# Patient Record
Sex: FEMALE | Race: WHITE | NOT HISPANIC OR LATINO | URBAN - METROPOLITAN AREA
[De-identification: names, ages, dates, MRNs, and addresses within clinical notes are randomized per-mention and may not be internally consistent; named-entity substitution may affect disease eponyms.]

---

## 2017-06-01 ENCOUNTER — OUTPATIENT (OUTPATIENT)
Dept: OUTPATIENT SERVICES | Facility: HOSPITAL | Age: 82
LOS: 1 days | End: 2017-06-01
Payer: MEDICAID

## 2017-06-01 DIAGNOSIS — Z98.89 OTHER SPECIFIED POSTPROCEDURAL STATES: Chronic | ICD-10-CM

## 2017-06-21 ENCOUNTER — INPATIENT (INPATIENT)
Facility: HOSPITAL | Age: 82
LOS: 4 days | Discharge: OTHER INSTITUTION | DRG: 552 | End: 2017-06-26
Attending: SURGERY | Admitting: SURGERY
Payer: MEDICARE

## 2017-06-21 VITALS
DIASTOLIC BLOOD PRESSURE: 73 MMHG | RESPIRATION RATE: 18 BRPM | OXYGEN SATURATION: 91 % | HEART RATE: 62 BPM | SYSTOLIC BLOOD PRESSURE: 238 MMHG | TEMPERATURE: 98 F

## 2017-06-21 DIAGNOSIS — Z29.9 ENCOUNTER FOR PROPHYLACTIC MEASURES, UNSPECIFIED: ICD-10-CM

## 2017-06-21 DIAGNOSIS — I73.9 PERIPHERAL VASCULAR DISEASE, UNSPECIFIED: ICD-10-CM

## 2017-06-21 DIAGNOSIS — I25.10 ATHEROSCLEROTIC HEART DISEASE OF NATIVE CORONARY ARTERY WITHOUT ANGINA PECTORIS: ICD-10-CM

## 2017-06-21 DIAGNOSIS — Z98.89 OTHER SPECIFIED POSTPROCEDURAL STATES: Chronic | ICD-10-CM

## 2017-06-21 DIAGNOSIS — M19.90 UNSPECIFIED OSTEOARTHRITIS, UNSPECIFIED SITE: ICD-10-CM

## 2017-06-21 DIAGNOSIS — E03.9 HYPOTHYROIDISM, UNSPECIFIED: ICD-10-CM

## 2017-06-21 DIAGNOSIS — S22.000A WEDGE COMPRESSION FRACTURE OF UNSPECIFIED THORACIC VERTEBRA, INITIAL ENCOUNTER FOR CLOSED FRACTURE: ICD-10-CM

## 2017-06-21 DIAGNOSIS — S22.029A UNSPECIFIED FRACTURE OF SECOND THORACIC VERTEBRA, INITIAL ENCOUNTER FOR CLOSED FRACTURE: ICD-10-CM

## 2017-06-21 DIAGNOSIS — G30.1 ALZHEIMER'S DISEASE WITH LATE ONSET: ICD-10-CM

## 2017-06-21 DIAGNOSIS — I10 ESSENTIAL (PRIMARY) HYPERTENSION: ICD-10-CM

## 2017-06-21 DIAGNOSIS — E78.5 HYPERLIPIDEMIA, UNSPECIFIED: ICD-10-CM

## 2017-06-21 LAB
ALBUMIN SERPL ELPH-MCNC: 4 G/DL — SIGNIFICANT CHANGE UP (ref 3.3–5)
ALP SERPL-CCNC: 64 U/L — SIGNIFICANT CHANGE UP (ref 40–120)
ALT FLD-CCNC: 24 U/L RC — SIGNIFICANT CHANGE UP (ref 10–45)
ANION GAP SERPL CALC-SCNC: 16 MMOL/L — SIGNIFICANT CHANGE UP (ref 5–17)
APPEARANCE UR: CLEAR — SIGNIFICANT CHANGE UP
APTT BLD: 23.5 SEC — LOW (ref 27.5–37.4)
AST SERPL-CCNC: 27 U/L — SIGNIFICANT CHANGE UP (ref 10–40)
BASOPHILS # BLD AUTO: 0.1 K/UL — SIGNIFICANT CHANGE UP (ref 0–0.2)
BASOPHILS NFR BLD AUTO: 1.3 % — SIGNIFICANT CHANGE UP (ref 0–2)
BILIRUB SERPL-MCNC: 0.6 MG/DL — SIGNIFICANT CHANGE UP (ref 0.2–1.2)
BILIRUB UR-MCNC: NEGATIVE — SIGNIFICANT CHANGE UP
BUN SERPL-MCNC: 32 MG/DL — HIGH (ref 7–23)
CALCIUM SERPL-MCNC: 9.5 MG/DL — SIGNIFICANT CHANGE UP (ref 8.4–10.5)
CHLORIDE SERPL-SCNC: 102 MMOL/L — SIGNIFICANT CHANGE UP (ref 96–108)
CO2 SERPL-SCNC: 24 MMOL/L — SIGNIFICANT CHANGE UP (ref 22–31)
COLOR SPEC: YELLOW — SIGNIFICANT CHANGE UP
CREAT SERPL-MCNC: 1.21 MG/DL — SIGNIFICANT CHANGE UP (ref 0.5–1.3)
DIFF PNL FLD: NEGATIVE — SIGNIFICANT CHANGE UP
EOSINOPHIL # BLD AUTO: 0.1 K/UL — SIGNIFICANT CHANGE UP (ref 0–0.5)
EOSINOPHIL NFR BLD AUTO: 1.1 % — SIGNIFICANT CHANGE UP (ref 0–6)
EPI CELLS # UR: SIGNIFICANT CHANGE UP /HPF
GLUCOSE SERPL-MCNC: 130 MG/DL — HIGH (ref 70–99)
GLUCOSE UR QL: NEGATIVE — SIGNIFICANT CHANGE UP
HCT VFR BLD CALC: 36.3 % — SIGNIFICANT CHANGE UP (ref 34.5–45)
HGB BLD-MCNC: 12.3 G/DL — SIGNIFICANT CHANGE UP (ref 11.5–15.5)
INR BLD: 1.01 RATIO — SIGNIFICANT CHANGE UP (ref 0.88–1.16)
KETONES UR-MCNC: ABNORMAL
LEUKOCYTE ESTERASE UR-ACNC: NEGATIVE — SIGNIFICANT CHANGE UP
LYMPHOCYTES # BLD AUTO: 3 K/UL — SIGNIFICANT CHANGE UP (ref 1–3.3)
LYMPHOCYTES # BLD AUTO: 37.2 % — SIGNIFICANT CHANGE UP (ref 13–44)
MCHC RBC-ENTMCNC: 32.2 PG — SIGNIFICANT CHANGE UP (ref 27–34)
MCHC RBC-ENTMCNC: 33.8 GM/DL — SIGNIFICANT CHANGE UP (ref 32–36)
MCV RBC AUTO: 95.3 FL — SIGNIFICANT CHANGE UP (ref 80–100)
MONOCYTES # BLD AUTO: 0.7 K/UL — SIGNIFICANT CHANGE UP (ref 0–0.9)
MONOCYTES NFR BLD AUTO: 8.8 % — SIGNIFICANT CHANGE UP (ref 2–14)
NEUTROPHILS # BLD AUTO: 4.2 K/UL — SIGNIFICANT CHANGE UP (ref 1.8–7.4)
NEUTROPHILS NFR BLD AUTO: 51.6 % — SIGNIFICANT CHANGE UP (ref 43–77)
NITRITE UR-MCNC: NEGATIVE — SIGNIFICANT CHANGE UP
PH UR: 5.5 — SIGNIFICANT CHANGE UP (ref 5–8)
PLATELET # BLD AUTO: 191 K/UL — SIGNIFICANT CHANGE UP (ref 150–400)
POTASSIUM SERPL-MCNC: 4.3 MMOL/L — SIGNIFICANT CHANGE UP (ref 3.5–5.3)
POTASSIUM SERPL-SCNC: 4.3 MMOL/L — SIGNIFICANT CHANGE UP (ref 3.5–5.3)
PROT SERPL-MCNC: 6.8 G/DL — SIGNIFICANT CHANGE UP (ref 6–8.3)
PROT UR-MCNC: SIGNIFICANT CHANGE UP
PROTHROM AB SERPL-ACNC: 11 SEC — SIGNIFICANT CHANGE UP (ref 9.8–12.7)
RBC # BLD: 3.81 M/UL — SIGNIFICANT CHANGE UP (ref 3.8–5.2)
RBC # FLD: 12.4 % — SIGNIFICANT CHANGE UP (ref 10.3–14.5)
RBC CASTS # UR COMP ASSIST: SIGNIFICANT CHANGE UP /HPF (ref 0–2)
SODIUM SERPL-SCNC: 142 MMOL/L — SIGNIFICANT CHANGE UP (ref 135–145)
SP GR SPEC: 1.02 — SIGNIFICANT CHANGE UP (ref 1.01–1.02)
UROBILINOGEN FLD QL: NEGATIVE — SIGNIFICANT CHANGE UP
WBC # BLD: 8.1 K/UL — SIGNIFICANT CHANGE UP (ref 3.8–10.5)
WBC # FLD AUTO: 8.1 K/UL — SIGNIFICANT CHANGE UP (ref 3.8–10.5)
WBC UR QL: SIGNIFICANT CHANGE UP /HPF (ref 0–5)

## 2017-06-21 PROCEDURE — 72125 CT NECK SPINE W/O DYE: CPT | Mod: 26

## 2017-06-21 PROCEDURE — 99283 EMERGENCY DEPT VISIT LOW MDM: CPT

## 2017-06-21 PROCEDURE — 12001 RPR S/N/AX/GEN/TRNK 2.5CM/<: CPT

## 2017-06-21 PROCEDURE — 72170 X-RAY EXAM OF PELVIS: CPT | Mod: 26

## 2017-06-21 PROCEDURE — 71250 CT THORAX DX C-: CPT | Mod: 26

## 2017-06-21 PROCEDURE — 99285 EMERGENCY DEPT VISIT HI MDM: CPT | Mod: 25

## 2017-06-21 PROCEDURE — 93010 ELECTROCARDIOGRAM REPORT: CPT | Mod: 59

## 2017-06-21 PROCEDURE — 70450 CT HEAD/BRAIN W/O DYE: CPT | Mod: 26

## 2017-06-21 PROCEDURE — 74176 CT ABD & PELVIS W/O CONTRAST: CPT | Mod: 26

## 2017-06-21 RX ORDER — ACETAMINOPHEN 500 MG
1000 TABLET ORAL ONCE
Qty: 0 | Refills: 0 | Status: DISCONTINUED | OUTPATIENT
Start: 2017-06-21 | End: 2017-06-22

## 2017-06-21 RX ORDER — HALOPERIDOL DECANOATE 100 MG/ML
1 INJECTION INTRAMUSCULAR ONCE
Qty: 0 | Refills: 0 | Status: COMPLETED | OUTPATIENT
Start: 2017-06-21 | End: 2017-06-21

## 2017-06-21 RX ORDER — SIMVASTATIN 20 MG/1
20 TABLET, FILM COATED ORAL AT BEDTIME
Qty: 0 | Refills: 0 | Status: DISCONTINUED | OUTPATIENT
Start: 2017-06-21 | End: 2017-06-26

## 2017-06-21 RX ORDER — LEVOTHYROXINE SODIUM 125 MCG
75 TABLET ORAL DAILY
Qty: 0 | Refills: 0 | Status: DISCONTINUED | OUTPATIENT
Start: 2017-06-21 | End: 2017-06-26

## 2017-06-21 RX ORDER — ENOXAPARIN SODIUM 100 MG/ML
30 INJECTION SUBCUTANEOUS EVERY 24 HOURS
Qty: 0 | Refills: 0 | Status: DISCONTINUED | OUTPATIENT
Start: 2017-06-21 | End: 2017-06-26

## 2017-06-21 RX ORDER — METOPROLOL TARTRATE 50 MG
50 TABLET ORAL DAILY
Qty: 0 | Refills: 0 | Status: DISCONTINUED | OUTPATIENT
Start: 2017-06-21 | End: 2017-06-21

## 2017-06-21 RX ORDER — SIMVASTATIN 20 MG/1
20 TABLET, FILM COATED ORAL AT BEDTIME
Qty: 0 | Refills: 0 | Status: DISCONTINUED | OUTPATIENT
Start: 2017-06-21 | End: 2017-06-21

## 2017-06-21 RX ORDER — METOPROLOL TARTRATE 50 MG
10 TABLET ORAL ONCE
Qty: 0 | Refills: 0 | Status: COMPLETED | OUTPATIENT
Start: 2017-06-21 | End: 2017-06-21

## 2017-06-21 RX ORDER — ACETAMINOPHEN 500 MG
1000 TABLET ORAL ONCE
Qty: 0 | Refills: 0 | Status: COMPLETED | OUTPATIENT
Start: 2017-06-21 | End: 2017-06-21

## 2017-06-21 RX ORDER — METOPROLOL TARTRATE 50 MG
50 TABLET ORAL DAILY
Qty: 0 | Refills: 0 | Status: DISCONTINUED | OUTPATIENT
Start: 2017-06-21 | End: 2017-06-26

## 2017-06-21 RX ORDER — SODIUM CHLORIDE 9 MG/ML
1000 INJECTION INTRAMUSCULAR; INTRAVENOUS; SUBCUTANEOUS
Qty: 0 | Refills: 0 | Status: DISCONTINUED | OUTPATIENT
Start: 2017-06-21 | End: 2017-06-21

## 2017-06-21 RX ORDER — ASPIRIN/CALCIUM CARB/MAGNESIUM 324 MG
81 TABLET ORAL DAILY
Qty: 0 | Refills: 0 | Status: DISCONTINUED | OUTPATIENT
Start: 2017-06-21 | End: 2017-06-22

## 2017-06-21 RX ORDER — CILOSTAZOL 100 MG/1
50 TABLET ORAL DAILY
Qty: 0 | Refills: 0 | Status: DISCONTINUED | OUTPATIENT
Start: 2017-06-21 | End: 2017-06-26

## 2017-06-21 RX ADMIN — Medication 81 MILLIGRAM(S): at 10:51

## 2017-06-21 RX ADMIN — ENOXAPARIN SODIUM 30 MILLIGRAM(S): 100 INJECTION SUBCUTANEOUS at 10:52

## 2017-06-21 RX ADMIN — Medication 50 MILLIGRAM(S): at 10:51

## 2017-06-21 RX ADMIN — Medication 10 MILLIGRAM(S): at 19:15

## 2017-06-21 RX ADMIN — Medication 75 MICROGRAM(S): at 10:51

## 2017-06-21 RX ADMIN — SODIUM CHLORIDE 50 MILLILITER(S): 9 INJECTION INTRAMUSCULAR; INTRAVENOUS; SUBCUTANEOUS at 10:57

## 2017-06-21 RX ADMIN — Medication 1000 MILLIGRAM(S): at 02:55

## 2017-06-21 RX ADMIN — HALOPERIDOL DECANOATE 1 MILLIGRAM(S): 100 INJECTION INTRAMUSCULAR at 22:37

## 2017-06-21 RX ADMIN — CILOSTAZOL 50 MILLIGRAM(S): 100 TABLET ORAL at 10:52

## 2017-06-21 RX ADMIN — Medication 400 MILLIGRAM(S): at 02:55

## 2017-06-21 NOTE — H&P ADULT - NSHPSOCIALHISTORY_GEN_ALL_CORE
Patient lives a mile away from her son with a 24-hour aid.   No smoking   No history of alcohol or recreational drug use.

## 2017-06-21 NOTE — H&P ADULT - NSHPLABSRESULTS_GEN_ALL_CORE
Labs:                        12.3   8.1   )-----------( 191      ( 2017 04:32 )             36.3     142  |  102  |  32  ----------------------------<  130  4.3   |  24  |  1.21    Ca    9.5      2017 04:32    TPro  6.8  /  Alb  4.0  /  TBili  0.6  /  DBili  x   /  AST  27  /  ALT  24  /  AlkPhos  64  06-21  PT/INR - ( 2017 04:32 )   PT: 11.0 sec;   INR: 1.01 ratio    PTT - ( 2017 04:32 )  PTT:23.5 sec    Urinalysis Basic - ( 2017 07:46 )  Color: Yellow / Appearance: Clear / S.024 / pH: x  Gluc: x / Ketone: Moderate  / Bili: Negative / Urobili: Negative   Blood: x / Protein: Trace / Nitrite: Negative   Leuk Esterase: Negative / RBC: 3-5 /HPF / WBC 0-2 /HPF   Sq Epi: x / Non Sq Epi: OCC /HPF / Bacteria: x          Urinalysis Basic - ( 2017 07:46 )    Color: Yellow / Appearance: Clear / S.024 / pH: x  Gluc: x / Ketone: Moderate  / Bili: Negative / Urobili: Negative   Blood: x / Protein: Trace / Nitrite: Negative   Leuk Esterase: Negative / RBC: 3-5 /HPF / WBC 0-2 /HPF   Sq Epi: x / Non Sq Epi: OCC /HPF / Bacteria: x      Imaging Labs:                        12.3   8.1   )-----------( 191      ( 2017 04:32 )             36.3     142  |  102  |  32  ----------------------------<  130  4.3   |  24  |  1.21    Ca    9.5      2017 04:32    TPro  6.8  /  Alb  4.0  /  TBili  0.6  /  DBili  x   /  AST  27  /  ALT  24  /  AlkPhos  64  06-21  PT/INR - ( 2017 04:32 )   PT: 11.0 sec;   INR: 1.01 ratio    PTT - ( 2017 04:32 )  PTT:23.5 sec    Urinalysis Basic - ( 2017 07:46 )  Color: Yellow / Appearance: Clear / S.024 / pH: x  Gluc: x / Ketone: Moderate  / Bili: Negative / Urobili: Negative   Blood: x / Protein: Trace / Nitrite: Negative       Imaging:    CT head - negative for acute injuries  CT C-spine --> acute T2 fracture   CT chest --> DENISE consolidation  Leuk Esterase: Negative / RBC: 3-5 /HPF / WBC 0-2 /HPF   Sq Epi: x / Non Sq Epi: OCC /HPF / Bacteria: x          Urinalysis Basic - ( 2017 07:46 )    Color: Yellow / Appearance: Clear / S.024 / pH: x  Gluc: x / Ketone: Moderate  / Bili: Negative / Urobili: Negative   Blood: x / Protein: Trace / Nitrite: Negative   Leuk Esterase: Negative / RBC: 3-5 /HPF / WBC 0-2 /HPF   Sq Epi: x / Non Sq Epi: OCC /HPF / Bacteria: x      Imaging

## 2017-06-21 NOTE — ED PROVIDER NOTE - ATTENDING CONTRIBUTION TO CARE
89 F brought in by ambulance s/p fall out of bed. c/o upper back pain. no loss of consciousness. no AC. GCS 15, no focal neuro deficits. plan for analgesia and imaging to eval for spinal injury. 89 F brought in by ambulance s/p fall out of bed. c/o upper back pain. moderate, no radiation, worse with movement. no loss of consciousness. no AC. GCS 15, no focal neuro deficits. neurovascularly intact throughout. plan for analgesia and imaging to eval for spinal injury.

## 2017-06-21 NOTE — ED ADULT NURSE NOTE - OBJECTIVE STATEMENT
89 yo F arrived to the ed s/p fall by ambulance with @ home aid; unknown LOC, unwitnessed fall, aid reports pt was calling for help and was found on the fall in her bedroom which is carpeted; +LAC to the back of the head, hematoma noted to the forehead; pt A&Ox1, disoriented to place and time; pt arrived with c-collar in place; O@ sat 91% on RA, pt placed on 2L NC O2 sat 100%; abd soft non distended, denies cp, denies abd pain, pt hypertensive on arrival, aid reports pt takes medication for HTN, md @ bedside

## 2017-06-21 NOTE — ED PROVIDER NOTE - CARE PLAN
Principal Discharge DX:	Closed fracture of second thoracic vertebra, unspecified fracture morphology, initial encounter

## 2017-06-21 NOTE — CONSULT NOTE ADULT - SUBJECTIVE AND OBJECTIVE BOX
HPI: Patient is a 89 yo F who p/w mild back pain s/p fall from bed w/ acute T4 compression fx on CT T spine w/ mild bony retropulsion. Patient denies any numbness/tingling, shooting pain, focal weakness, and bladder/bowel incontinence. She ambulates w/ a walker at baseline and lives w/ her .     PAST MEDICAL HISTORY   Dementia  Peripheral Vascular Disease  Cholesterol Serum Increased  Hypothyroidism  Hypertension  History of Tonsillectomy  Coronary Artery Disease  Arthritis    PAST SURGICAL HISTORY   S/P ORIF (open reduction internal fixation) fracture  S/P tonsillectomy      SOCIAL HISTORY:   Occupation:   Marital Status:     FAMILY HISTORY:  No pertinent family history in first degree relatives        PHYSICAL EXAMINATION:   T(C): 36.7, Max: 36.7 (06-21 @ 01:55)  HR: 60 (60 - 66)  BP: 195/62 (195/62 - 238/73)  RR: 16 (16 - 18)  SpO2: 100% (91% - 100%)  Wt(kg): --    General Examination:   Alert, EOS, FC  ADHIKARI 5/5, no drift  SILT throughout  symmetric relfexes throughout  no clonus, no Damon's HPI: Patient is a 89 yo F who p/w mild back pain s/p fall from bed w/ acute T2 compression fx on CT T spine w/ mild bony retropulsion. Patient denies any numbness/tingling, shooting pain, focal weakness, and bladder/bowel incontinence. She ambulates w/ a walker at baseline and lives w/ her .     PAST MEDICAL HISTORY   Dementia  Peripheral Vascular Disease  Cholesterol Serum Increased  Hypothyroidism  Hypertension  History of Tonsillectomy  Coronary Artery Disease  Arthritis    PAST SURGICAL HISTORY   S/P ORIF (open reduction internal fixation) fracture  S/P tonsillectomy      SOCIAL HISTORY:   Occupation:   Marital Status:     FAMILY HISTORY:  No pertinent family history in first degree relatives        PHYSICAL EXAMINATION:   T(C): 36.7, Max: 36.7 (06-21 @ 01:55)  HR: 60 (60 - 66)  BP: 195/62 (195/62 - 238/73)  RR: 16 (16 - 18)  SpO2: 100% (91% - 100%)  Wt(kg): --    General Examination:   Alert, EOS, FC  ADHIKARI 5/5, no drift  SILT throughout  symmetric relfexes throughout  no clonus, no Damon's

## 2017-06-21 NOTE — H&P ADULT - NSHPPHYSICALEXAM_GEN_ALL_CORE
Vital Signs Last 24 Hrs  T(C): 36.6, Max: 36.7 (06-21 @ 01:55)  HR: 72 (59 - 72)  BP: 176/61 (172/53 - 238/73)  RR: 18 (16 - 18)  SpO2: 100% (91% - 100%)    Constitutional: Frail elderly female in no acute distress, A&O x 2 --> responds to commands, currently @ baseline MS as per son and aid  HEENT: Head is normocephalic w/ midline parietal scalp laceration s/p staple x 1 + forehead ecchymosis   maxillofacial structures stable, no blood or discharge from nares or oral cavity, no medellin sign / racoon eyes, EOMI b/l, pupils 3mm round and reactive to light b/l  Neck: No C-spine TTP   Respiratory: Breath sounds CTA b/l respirations are unlabored, no accessory muscle use, no conversational dyspnea  Cardiovascular: Regular rate & rhythm, +S1, S2  Chest: Chest wall is non-tender to palpation, no subQ emphysema or crepitus palpated  Gastrointestinal: Abdomen soft, non-distended, w/ mild epigastric tenderness (+ hyperpigmented lesion), no rebound tenderness / guarding, no ecchymosis or external signs of abdominal trauma  Extremities: moving all extremities spontaneously, no point tenderness or deformity noted to upper or lower extremities b/l. +old bruises on bilateral shins.   Pelvis: stable  Vascular: 2+ radial b/l, 2+ R femoral / 1+ L femoral, and 1+ DP pulses b/l  Neurological: GCS: 15 (4/5/6).; no gross sensory / motor / coordination deficits  Musculoskeletal: 5/5 strength of upper and 4/5 lower extremities b/l  Back: no C/T/LS spine tenderness to palpation, no step-offs or signs of external trauma to the back.    Patient could not walk with PT with walker while

## 2017-06-21 NOTE — ED ADULT NURSE REASSESSMENT NOTE - NS ED NURSE REASSESS COMMENT FT1
0700 Report received from night nurse. Pt in CCA. Trauma consult in progress. Son and home attendant at [facility]. awaing urine sample for U/A. voided earlier. Sample was discarded earlier. A&Ox 2. Cooperative. color pink. skin W&D. Lungs clear. abd soft. TTP left lower ribs. Dried blood noted in hair. staples intact at scalp. IVL intact LACF without sx of infilt. abd CT to be ordered by trauma  0700 Report received from night nurse. Pt in CCA. Trauma consult in progress. Son and home attendant at [facility]. awaing urine sample for U/A. voided earlier. Sample was discarded earlier. A&Ox 2. Cooperative. color pink. skin W&D. Lungs decreased breath sounds left base, otherwise clear. abd soft. TTP left lower ribs. Dried blood noted in hair. staples intact at scalp. IVL intact LACF without sx of infilt. abd CT to be ordered by trauma Dr. [id number] clear yellow urine. U/A sent. 0805 TBA Trauma surg. no bed yet. Awaiting abd CT 0700 Report received from night nurse. Pt in CCA. Trauma consult in progress. Son and home attendant at [facility]. awaing urine sample for U/A. voided earlier. Sample was discarded earlier. A&Ox 2. Cooperative. color pink. skin W&D. Lungs decreased breath sounds left base, otherwise clear. abd soft. TTP left lower ribs. Dried blood noted in hair. staples intact at scalp.Ecchymosis at right forehead. IVL intact LACF without sx of infilt. abd CT to be ordered by trauma Dr. [id number] clear yellow urine. U/A sent. 0805 TBA Trauma surg. no bed yet. Awaiting abd CT

## 2017-06-21 NOTE — CONSULT NOTE ADULT - ASSESSMENT
This is a 89 yo F s/p fall w/ acute T4 compression fx on CT T spine w/ mild bony retropulsion. She is neurologically intact w/ mild pain. No neurosurgical intervention indicated. This is a 89 yo F s/p fall w/ acute T2 compression fx on CT T spine w/ mild bony retropulsion. She is neurologically intact w/ mild pain. No neurosurgical intervention indicated.

## 2017-06-21 NOTE — H&P ADULT - HISTORY OF PRESENT ILLNESS
This is a 89 year old female w/ PMH of HTN, hypothyroidism, CAD (no stents), CKD III, arthritis and R hip fx s/p ORIF by Dr Hunt in 2012 who presents today after an unwitnessed fall off of her bed some time last night. She does not recall the fall, unknown LOC, the aid endorses that she just saw her later after she had fallen down. She ambulates with a walker at home. The son mentions that she had a similar fall in 10/2016 where she had a scalp laceration with no intracranial injuries and was discharged from the ED at the time. She denies headache, she c/o vague pain all over, no fevers, no chills, no seizures as per family. She does make urine (w/ diapers). No abdominal pain, no nausea, no vomiting. She denies chest pain or SOB.    1ry survey - intact --> GCS 15, AVSS  2ry survey --> + midline scalp lac & forehead ecchymosis, + epigastric abd TTP

## 2017-06-21 NOTE — ED PROVIDER NOTE - CONSTITUTIONAL, MLM
normal... appears stated age, awake, alert, oriented to person, place, time/situation and in no apparent distress.

## 2017-06-21 NOTE — ED PROVIDER NOTE - PROGRESS NOTE DETAILS
T spine compression fxr, plan for neurosurgical and trauma consults patient cleared by spine. no need for brace. patient unable to ambulate d/t generalized weakness. plan for admission.

## 2017-06-21 NOTE — CONSULT NOTE ADULT - PROBLEM SELECTOR PROBLEM 1
Coronary artery disease involving native coronary artery of native heart without angina pectoris
Compression fx, thoracic spine

## 2017-06-21 NOTE — ED ADULT NURSE NOTE - PMH
Arthritis    Cholesterol Serum Increased    Coronary Artery Disease  no h/o PCI or CABG  Dementia  baseline MS AOx1-2 per son as of 10/2016  Hypertension    Hypothyroidism    Peripheral Vascular Disease

## 2017-06-21 NOTE — CONSULT NOTE ADULT - SUBJECTIVE AND OBJECTIVE BOX
HPI: Patient is a 89 yo F who p/w mild back pain s/p fall from bed w/ acute T2 compression fx on CT T spine w/ mild bony retropulsion. Patient denies any numbness/tingling, shooting pain, focal weakness, and bladder/bowel incontinence. She ambulates w/ a walker at baseline and lives w/ her .     MEDICATIONS  (STANDING):  aspirin enteric coated 81milliGRAM(s) Oral daily  cilostazol 50milliGRAM(s) Oral daily  enoxaparin Injectable 30milliGRAM(s) SubCutaneous every 24 hours  sodium chloride 0.9%. 1000milliLiter(s) IV Continuous <Continuous>  acetaminophen  IVPB. 1000milliGRAM(s) IV Intermittent once  metoprolol succinate ER 50milliGRAM(s) Oral daily  simvastatin 20milliGRAM(s) Oral at bedtime  levothyroxine 75MICROGram(s) Oral daily    MEDICATIONS  (PRN):    Allergies    Cipro (Other)    Intolerances      PAST MEDICAL HISTORY   Dementia  Peripheral Vascular Disease  Cholesterol Serum Increased  Hypothyroidism  Hypertension  History of Tonsillectomy  Coronary Artery Disease  Arthritis    PAST SURGICAL HISTORY   S/P ORIF (open reduction internal fixation) fracture  S/P tonsillectomy      SOCIAL HISTORY:   Occupation: retired  Marital Status: w  tobacco never  etoh no  drugs no    FAMILY HISTORY:  No pertinent family history in first degree relatives    ICU Vital Signs Last 24 Hrs  T(C): 36.3, Max: 36.7 (06-21 @ 01:55)  T(F): 97.4, Max: 98.1 (06-21 @ 01:55)  HR: 63 (59 - 72)  BP: 166/69 (166/69 - 238/73)  BP(mean): --  ABP: --  ABP(mean): --  RR: 18 (16 - 18)  SpO2: 98% (91% - 100%)    PHYSICAL EXAM:      Constitutional: nad    Eyes: PERRL EOMI    ENMT: nl    Neck: no jvd no ln    Breasts: deferred    Back: kyphosis pain thoracic region    Respiratory: CTAB    Cardiovascular: rrr c II/VI syst m    Gastrointestinal: soft nt nd +BS    Genitourinary: deferred    Rectal: deferred    Extremities: no cce    Neurological: demented non focal    Skin: no rash or suspiscious lesions    Lymph Nodes: nl    Musculoskeletal: nl    Psychiatric: pleasantly demented HPI: Patient is a 89 yo White female with coronary artery disease, hypertension, hyperlipidemia, peripheral vascular disease and advanced dementia with behavior disturbances but had been in her usual state of health until this morning, fell at home and was complaining of pain on the back.  Patient's home health aide called 911.  Patient was brought to the emergency room at [facility] she was found to have a T2 compression fracture.  CT of the brain was negative for bleed or infarct.  Patient was unable to stand up and ambulate so was kept for pain control and possible rehabilitation and further evaluation.  Patient was admitted to trauma service.  Patient denies any chest pain, shortness of breath, lightheadedness, dizziness, vertigo, or palpitations.  No loss of consciousness    MEDICATIONS  (STANDING):  aspirin enteric coated 81milliGRAM(s) Oral daily  cilostazol 50milliGRAM(s) Oral daily  enoxaparin Injectable 30milliGRAM(s) SubCutaneous every 24 hours  sodium chloride 0.9%. 1000milliLiter(s) IV Continuous <Continuous>  acetaminophen  IVPB. 1000milliGRAM(s) IV Intermittent once  metoprolol succinate ER 50milliGRAM(s) Oral daily  simvastatin 20milliGRAM(s) Oral at bedtime  levothyroxine 75MICROGram(s) Oral daily    MEDICATIONS  (PRN):    Allergies    Cipro (Other)    Intolerances      PAST MEDICAL HISTORY   Dementia  Peripheral Vascular Disease  Cholesterol Serum Increased  Hypothyroidism  Hypertension  History of Tonsillectomy  Coronary Artery Disease  Arthritis    PAST SURGICAL HISTORY   S/P ORIF (open reduction internal fixation) fracture  S/P tonsillectomy      SOCIAL HISTORY:   Occupation: retired  Marital Status: w  tobacco never  etoh no  drugs no    FAMILY HISTORY:  No pertinent family history in first degree relatives    ICU Vital Signs Last 24 Hrs  T(C): 36.3, Max: 36.7 (06-21 @ 01:55)  T(F): 97.4, Max: 98.1 (06-21 @ 01:55)  HR: 63 (59 - 72)  BP: 166/69 (166/69 - 238/73)  BP(mean): --  ABP: --  ABP(mean): --  RR: 18 (16 - 18)  SpO2: 98% (91% - 100%)    PHYSICAL EXAM:      Constitutional: nad    Eyes: PERRL EOMI    ENMT: nl    Neck: no jvd no ln    Breasts: deferred    Back: kyphosis pain thoracic region    Respiratory: CTAB    Cardiovascular: regular rate and rhythm with II/VI systolic murmur I/VI diastolic murmur    Gastrointestinal: soft, nontender, nondistended, normal active  bowel sounds x 4 quadrants    Genitourinary: deferred    Rectal: deferred    Extremities: no cce    Neurological: demented non focal    Skin: no rash or suspiscious lesions    Lymph Nodes: nl    Musculoskeletal: nl    Psychiatric: pleasantly demented

## 2017-06-21 NOTE — H&P ADULT - ASSESSMENT
89F s/p fall off bed w/ T2 fx and DENISE consolidation   - Admit to trauma service.   - NPO for now  - Resume home meds  - F/u CT   - Seen and examined with Dr Good.  - Spoke w/ Dr Devi - will follow patient as inpatient. 89F s/p fall off bed w/ T2 fx and DENISE consolidation   - Admit to trauma service.   - NPO for now  - Resume home meds  - F/u CT   - T2 fracture non-op as per spine.   - Seen and examined with Dr Good.  - Spoke w/ Dr Devi - will follow patient as inpatient.

## 2017-06-22 DIAGNOSIS — R69 ILLNESS, UNSPECIFIED: ICD-10-CM

## 2017-06-22 PROCEDURE — 99231 SBSQ HOSP IP/OBS SF/LOW 25: CPT

## 2017-06-22 RX ORDER — OLANZAPINE 15 MG/1
1.25 TABLET, FILM COATED ORAL AT BEDTIME
Qty: 0 | Refills: 0 | Status: DISCONTINUED | OUTPATIENT
Start: 2017-06-22 | End: 2017-06-26

## 2017-06-22 RX ORDER — ASPIRIN/CALCIUM CARB/MAGNESIUM 324 MG
81 TABLET ORAL DAILY
Qty: 0 | Refills: 0 | Status: DISCONTINUED | OUTPATIENT
Start: 2017-06-22 | End: 2017-06-26

## 2017-06-22 RX ORDER — ACETAMINOPHEN 500 MG
650 TABLET ORAL EVERY 6 HOURS
Qty: 0 | Refills: 0 | Status: DISCONTINUED | OUTPATIENT
Start: 2017-06-22 | End: 2017-06-26

## 2017-06-22 RX ADMIN — Medication 650 MILLIGRAM(S): at 18:39

## 2017-06-22 RX ADMIN — ENOXAPARIN SODIUM 30 MILLIGRAM(S): 100 INJECTION SUBCUTANEOUS at 10:29

## 2017-06-22 RX ADMIN — Medication 75 MICROGRAM(S): at 10:29

## 2017-06-22 RX ADMIN — Medication 63.75 MILLIMOLE(S): at 12:03

## 2017-06-22 RX ADMIN — OLANZAPINE 1.25 MILLIGRAM(S): 15 TABLET, FILM COATED ORAL at 21:08

## 2017-06-22 RX ADMIN — Medication 50 MILLIGRAM(S): at 10:28

## 2017-06-22 RX ADMIN — CILOSTAZOL 50 MILLIGRAM(S): 100 TABLET ORAL at 12:00

## 2017-06-22 RX ADMIN — SIMVASTATIN 20 MILLIGRAM(S): 20 TABLET, FILM COATED ORAL at 21:08

## 2017-06-22 RX ADMIN — Medication 650 MILLIGRAM(S): at 12:00

## 2017-06-22 RX ADMIN — Medication 81 MILLIGRAM(S): at 12:51

## 2017-06-22 NOTE — PROVIDER CONTACT NOTE (OTHER) - ASSESSMENT
Pt agitated and disoriented to situation at baseline, BP elevated at baseline, /68, VS otherwise stable. Pt agitated combative, refusing medications. Meds tried crushed and admin - pt still refused.  Pt currently resting comfortably in bed, no longer agitated when staff left room.

## 2017-06-22 NOTE — PROGRESS NOTE ADULT - SUBJECTIVE AND OBJECTIVE BOX
GENERAL SURGERY DAILY PROGRESS NOTE:       Subjective:  agitated overnight per nurses, received haldol.  refusing meds      Objective:    resting comfortably in bed      MEDICATIONS  (STANDING):  aspirin enteric coated 81milliGRAM(s) Oral daily  cilostazol 50milliGRAM(s) Oral daily  enoxaparin Injectable 30milliGRAM(s) SubCutaneous every 24 hours  acetaminophen  IVPB. 1000milliGRAM(s) IV Intermittent once  metoprolol succinate ER 50milliGRAM(s) Oral daily  simvastatin 20milliGRAM(s) Oral at bedtime  levothyroxine 75MICROGram(s) Oral daily  OLANZapine 1.25milliGRAM(s) Oral at bedtime    MEDICATIONS  (PRN):      Vital Signs Last 24 Hrs  T(C): 36.6, Max: 36.9 ( @ 17:46)  T(F): 97.9, Max: 98.4 ( @ 17:46)  HR: 66 (63 - 94)  BP: 194/68 (166/69 - 215/80)  BP(mean): --  RR: 18 (18 - 18)  SpO2: 91% (91% - 98%)    I&O's Detail      Daily Height in cm: 149.86 (2017 19:56)    Daily     LABS:                        12.3   8.1   )-----------( 191      ( 2017 04:32 )             36.3     -    142  |  102  |  32<H>  ----------------------------<  130<H>  4.3   |  24  |  1.21    Ca    9.5      2017 04:32    TPro  6.8  /  Alb  4.0  /  TBili  0.6  /  DBili  x   /  AST  27  /  ALT  24  /  AlkPhos  64  06-21    PT/INR - ( 2017 04:32 )   PT: 11.0 sec;   INR: 1.01 ratio         PTT - ( 2017 04:32 )  PTT:23.5 sec  Urinalysis Basic - ( 2017 07:46 )    Color: Yellow / Appearance: Clear / S.024 / pH: x  Gluc: x / Ketone: Moderate  / Bili: Negative / Urobili: Negative   Blood: x / Protein: Trace / Nitrite: Negative   Leuk Esterase: Negative / RBC: 3-5 /HPF / WBC 0-2 /HPF   Sq Epi: x / Non Sq Epi: OCC /HPF / Bacteria: x

## 2017-06-22 NOTE — PROGRESS NOTE ADULT - SUBJECTIVE AND OBJECTIVE BOX
subjective: pleasantly demented, denies any complaints    aspirin enteric coated 81milliGRAM(s) Oral daily  cilostazol 50milliGRAM(s) Oral daily  enoxaparin Injectable 30milliGRAM(s) SubCutaneous every 24 hours  metoprolol succinate ER 50milliGRAM(s) Oral daily  simvastatin 20milliGRAM(s) Oral at bedtime  levothyroxine 75MICROGram(s) Oral daily  OLANZapine 1.25milliGRAM(s) Oral at bedtime  acetaminophen   Tablet 650milliGRAM(s) Oral every 6 hours      T(C): 36.6, Max: 36.9 (06-21 @ 17:46)  HR: 66 (66 - 94)  BP: 194/68 (170/68 - 215/80)  RR: 18 (18 - 18)  SpO2: 91% (91% - 97%)  Wt(kg): --    I & Os for current day (as of 06-22 @ 10:54)  =============================================  IN: 120 ml / OUT: 0 ml / NET: 120 ml        Gen AAOx1  Heart RRR c II/VI syst M  lungs CTAB  abdomen soft nt nd +BS  ext no CCE  MS mild right lower costochondral tenderness    labs                          12.3   8.6   )-----------( 173      ( 22 Jun 2017 09:38 )             36.4   06-22    138  |  99  |  27<H>  ----------------------------<  117<H>  3.8   |  23  |  0.85    Ca    9.3      22 Jun 2017 09:37  Phos  2.5     06-22  Mg     2.1     06-22    TPro  6.8  /  Alb  4.0  /  TBili  0.6  /  DBili  x   /  AST  27  /  ALT  24  /  AlkPhos  64  06-21

## 2017-06-22 NOTE — PHYSICAL THERAPY INITIAL EVALUATION ADULT - ADDITIONAL COMMENTS
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  +CT chest 6/21/17: Patchy opacity in the periphery of the left upper lobe could represent pulmonary contusion.  +CT cervical spine 6/21/17: Acute fracture lucency through the T2 vertebral body with compression of the vertebral body and mild bony retropulsion. Fracture lucency extends to involve the posterior elements. Multilevel cervical spondylosis.    Pt's daughter in law states pt lives in a apt with +elevator. Pt ambulates with RW and owns wheelchair. Pt has a HHA for 24 hr/7 days a week.

## 2017-06-22 NOTE — PHYSICAL THERAPY INITIAL EVALUATION ADULT - PERTINENT HX OF CURRENT PROBLEM, REHAB EVAL
Pt is a 89 year old female w/ PMH of HTN, hypothyroidism, CAD (no stents), CKD III, arthritis and R hip fx s/p ORIF by Dr Hunt in 2012 who presents today after an unwitnessed fall off of her bed some time last night. +CT abdomen and pelvis 6/22/17: Age indeterminate compression deformity of the T10 vertebral body. Trace pericardial effusion. Continued below.

## 2017-06-22 NOTE — PROGRESS NOTE ADULT - ASSESSMENT
89F s/p fall with L2 compression fx  -home meds - will attempt to administer while family at bedside  -PT eval  -pain control  -IS  -DVT ppx 89F s/p fall with T2 compression fx  -home meds - will attempt to administer while family at bedside  -PT eval  -pain control  -IS  -DVT ppx

## 2017-06-23 DIAGNOSIS — K59.00 CONSTIPATION, UNSPECIFIED: ICD-10-CM

## 2017-06-23 DIAGNOSIS — N18.3 CHRONIC KIDNEY DISEASE, STAGE 3 (MODERATE): ICD-10-CM

## 2017-06-23 DIAGNOSIS — E03.9 HYPOTHYROIDISM, UNSPECIFIED: ICD-10-CM

## 2017-06-23 PROCEDURE — 99232 SBSQ HOSP IP/OBS MODERATE 35: CPT

## 2017-06-23 RX ORDER — METOPROLOL TARTRATE 50 MG
5 TABLET ORAL ONCE
Qty: 0 | Refills: 0 | Status: COMPLETED | OUTPATIENT
Start: 2017-06-23 | End: 2017-06-23

## 2017-06-23 RX ORDER — SENNA PLUS 8.6 MG/1
1 TABLET ORAL AT BEDTIME
Qty: 0 | Refills: 0 | Status: DISCONTINUED | OUTPATIENT
Start: 2017-06-23 | End: 2017-06-26

## 2017-06-23 RX ORDER — DOCUSATE SODIUM 100 MG
100 CAPSULE ORAL DAILY
Qty: 0 | Refills: 0 | Status: DISCONTINUED | OUTPATIENT
Start: 2017-06-23 | End: 2017-06-23

## 2017-06-23 RX ADMIN — ENOXAPARIN SODIUM 30 MILLIGRAM(S): 100 INJECTION SUBCUTANEOUS at 11:51

## 2017-06-23 RX ADMIN — CILOSTAZOL 50 MILLIGRAM(S): 100 TABLET ORAL at 13:09

## 2017-06-23 RX ADMIN — Medication 5 MILLIGRAM(S): at 04:56

## 2017-06-23 RX ADMIN — Medication 650 MILLIGRAM(S): at 17:56

## 2017-06-23 RX ADMIN — Medication 75 MICROGRAM(S): at 05:08

## 2017-06-23 RX ADMIN — Medication 81 MILLIGRAM(S): at 11:49

## 2017-06-23 RX ADMIN — Medication 10 MILLIGRAM(S): at 12:59

## 2017-06-23 RX ADMIN — Medication 50 MILLIGRAM(S): at 06:03

## 2017-06-23 RX ADMIN — Medication 650 MILLIGRAM(S): at 11:49

## 2017-06-23 RX ADMIN — Medication 650 MILLIGRAM(S): at 05:08

## 2017-06-23 RX ADMIN — Medication 110 MILLIGRAM(S): at 07:14

## 2017-06-23 NOTE — PROVIDER CONTACT NOTE (OTHER) - ACTION/TREATMENT ORDERED:
metropolol 10mg ivp to be given
MD Dimas Crespo and ATP team made aware on AM rounds. Aware of systolic /68. Ordered to administer meds when family at bedside. Bed alarm active, safety maintained, will cont to mon.
Metoprolol 5mg IVPB. Colace and Senna ordered.
Metoprolol 5mg IVP.

## 2017-06-23 NOTE — PROVIDER CONTACT NOTE (OTHER) - NAME OF MD/NP/PA/DO NOTIFIED:
Faviola Hubbard MD
Marianne Cochran MD
Marianne marques [facility] pa
MD Dimas Crespo, ATP rounding team

## 2017-06-23 NOTE — PROGRESS NOTE ADULT - SUBJECTIVE AND OBJECTIVE BOX
subjective: pleasantly demented,c/o constipation    MEDICATIONS  (STANDING):  cilostazol 50milliGRAM(s) Oral daily  enoxaparin Injectable 30milliGRAM(s) SubCutaneous every 24 hours  metoprolol succinate ER 50milliGRAM(s) Oral daily  simvastatin 20milliGRAM(s) Oral at bedtime  levothyroxine 75MICROGram(s) Oral daily  OLANZapine 1.25milliGRAM(s) Oral at bedtime  acetaminophen   Tablet 650milliGRAM(s) Oral every 6 hours  aspirin  chewable 81milliGRAM(s) Oral daily  senna 1Tablet(s) Oral at bedtime  docusate sodium 100milliGRAM(s) Oral daily    MEDICATIONS  (PRN):    Gen AAOx1  Heart RRR c II/VI syst M  lungs CTAB  abdomen soft nt nd +BS  ext no CCE  MS mild right lower costochondral tenderness    labs                        12.3   8.6   )-----------( 173      ( 22 Jun 2017 09:38 )             36.4   06-22    138  |  99  |  27<H>  ----------------------------<  117<H>  3.8   |  23  |  0.85    Ca    9.3      22 Jun 2017 09:37  Phos  2.5     06-22  Mg     2.1     06-22

## 2017-06-23 NOTE — PROGRESS NOTE ADULT - ASSESSMENT
89F s/p fall with T2 compression fx  -home meds - will attempt to administer while family at bedside  -PT eval  -pain control  -IS  -BP control  -d/c planning to ERIS  -DVT ppx

## 2017-06-23 NOTE — PROVIDER CONTACT NOTE (OTHER) - SITUATION
Patient is hypertensive
Patient is hypertensive
Pt BP elevated, refusing meds
elevated bp 215/80 hr 94

## 2017-06-23 NOTE — PROVIDER CONTACT NOTE (OTHER) - RECOMMENDATIONS
Will continue to monitor closely.
Team assessed patient during am rounds. Will continue to monitor closely.

## 2017-06-23 NOTE — PROGRESS NOTE ADULT - SUBJECTIVE AND OBJECTIVE BOX
GENERAL SURGERY DAILY PROGRESS NOTE:       Subjective:  c/o mild abd pain.  no agitation overnight per nursing      Objective:  NAD  lying comfortably in bed  abd soft NTND      MEDICATIONS  (STANDING):  cilostazol 50milliGRAM(s) Oral daily  enoxaparin Injectable 30milliGRAM(s) SubCutaneous every 24 hours  metoprolol succinate ER 50milliGRAM(s) Oral daily  simvastatin 20milliGRAM(s) Oral at bedtime  levothyroxine 75MICROGram(s) Oral daily  OLANZapine 1.25milliGRAM(s) Oral at bedtime  acetaminophen   Tablet 650milliGRAM(s) Oral every 6 hours  aspirin  chewable 81milliGRAM(s) Oral daily  senna 1Tablet(s) Oral at bedtime  docusate sodium 100milliGRAM(s) Oral daily    MEDICATIONS  (PRN):  bisacodyl Suppository 10milliGRAM(s) Rectal daily PRN Constipation      Vital Signs Last 24 Hrs  T(C): 36.3, Max: 36.7 (06-22 @ 17:01)  T(F): 97.4, Max: 98 (06-22 @ 17:01)  HR: 71 (51 - 85)  BP: 175/71 (150/69 - 195/70)  BP(mean): --  RR: 16 (16 - 18)  SpO2: 94% (93% - 98%)    I&O's Detail    I & Os for current day (as of 23 Jun 2017 08:33)  =============================================  IN:    Oral Fluid: 610 ml    Total IN: 610 ml  ---------------------------------------------  OUT:    Total OUT: 0 ml  ---------------------------------------------  Total NET: 610 ml      Daily     Daily     LABS:                        12.3   8.6   )-----------( 173      ( 22 Jun 2017 09:38 )             36.4     06-22    138  |  99  |  27<H>  ----------------------------<  117<H>  3.8   |  23  |  0.85    Ca    9.3      22 Jun 2017 09:37  Phos  2.5     06-22  Mg     2.1     06-22      PT/INR - ( 22 Jun 2017 09:37 )   PT: 11.1 sec;   INR: 1.02 ratio         PTT - ( 22 Jun 2017 09:37 )  PTT:28.8 sec

## 2017-06-24 DIAGNOSIS — S22.000A WEDGE COMPRESSION FRACTURE OF UNSPECIFIED THORACIC VERTEBRA, INITIAL ENCOUNTER FOR CLOSED FRACTURE: ICD-10-CM

## 2017-06-24 PROCEDURE — 99232 SBSQ HOSP IP/OBS MODERATE 35: CPT

## 2017-06-24 RX ADMIN — Medication 81 MILLIGRAM(S): at 11:39

## 2017-06-24 RX ADMIN — Medication 75 MICROGRAM(S): at 10:01

## 2017-06-24 RX ADMIN — Medication 50 MILLIGRAM(S): at 10:01

## 2017-06-24 RX ADMIN — Medication 650 MILLIGRAM(S): at 11:38

## 2017-06-24 RX ADMIN — OLANZAPINE 1.25 MILLIGRAM(S): 15 TABLET, FILM COATED ORAL at 21:54

## 2017-06-24 RX ADMIN — ENOXAPARIN SODIUM 30 MILLIGRAM(S): 100 INJECTION SUBCUTANEOUS at 10:01

## 2017-06-24 RX ADMIN — CILOSTAZOL 50 MILLIGRAM(S): 100 TABLET ORAL at 11:39

## 2017-06-24 RX ADMIN — Medication 650 MILLIGRAM(S): at 16:57

## 2017-06-24 RX ADMIN — SIMVASTATIN 20 MILLIGRAM(S): 20 TABLET, FILM COATED ORAL at 21:54

## 2017-06-24 NOTE — PROGRESS NOTE ADULT - SUBJECTIVE AND OBJECTIVE BOX
DEIRDREWILMERYOLIE  89y  Female      Patient is a 89y old  Female who presents with a chief complaint of s/p mechanical fall (21 Jun 2017 07:57). In bed, appears comfortable. Denies pain. No new events.      INTERVAL HPI/OVERNIGHT EVENTS:        REVIEW OF SYSTEMS:  CONSTITUTIONAL: No fever, weight loss, or fatigue  EYES: No eye pain, visual disturbances, or discharge  ENMT:  No difficulty hearing, tinnitus, vertigo; No sinus or throat pain  NECK: No pain or stiffness  BREASTS: No pain, masses, or nipple discharge  RESPIRATORY: No cough, wheezing, chills or hemoptysis; No shortness of breath  CARDIOVASCULAR: No chest pain, palpitations, dizziness, or leg swelling  GASTROINTESTINAL: No abdominal or epigastric pain. No nausea, vomiting, or hematemesis; No diarrhea or constipation. No melena or hematochezia.  GENITOURINARY: No dysuria, frequency, hematuria, or incontinence  NEUROLOGICAL: No headaches, memory loss, loss of strength, numbness, or tremors  SKIN: bruising on forehead, arms, legs  LYMPH NODES: No enlarged glands  ENDOCRINE: No heat or cold intolerance; No hair loss  MUSCULOSKELETAL: No joint pain or swelling; No muscle, back, or extremity pain  PSYCHIATRIC: No depression, anxiety, mood swings, or difficulty sleeping  HEME/LYMPH: No easy bruising, or bleeding gums  ALLERY AND IMMUNOLOGIC: No hives or eczema    T(C): 36.8, Max: 37 (06-24 @ 00:02)  HR: 84 (75 - 99)  BP: 148/72 (148/72 - 179/80)  RR: 18 (16 - 18)  SpO2: 95% (94% - 100%)  Wt(kg): --Vital Signs Last 24 Hrs  T(C): 36.8, Max: 37 (06-24 @ 00:02)  T(F): 98.2, Max: 98.6 (06-24 @ 00:02)  HR: 84 (75 - 99)  BP: 148/72 (148/72 - 179/80)  BP(mean): --  RR: 18 (16 - 18)  SpO2: 95% (94% - 100%)    PHYSICAL EXAM:  GENERAL: NAD, elderly, frail  HEAD:  Normocephalic, forehead ecchymosis  EYES: EOMI, PERRLA, conjunctiva and sclera clear  ENMT: No tonsillar erythema, exudates, or enlargement; Moist mucous membranes, Good dentition, No lesions  NECK: Supple, No JVD, Normal thyroid  NERVOUS SYSTEM:  Alert & Oriented X3, Good concentration; Motor Strength 5/5 B/L upper and lower extremities; DTRs 2+ intact and symmetric  CHEST/LUNG: Clear to percussion bilaterally; No rales, rhonchi, wheezing, or rubs  HEART: Regular rate and rhythm; No murmurs, rubs, or gallops  ABDOMEN: Soft, Nontender, Nondistended; Bowel sounds present  EXTREMITIES:  2+ Peripheral Pulses, No clubbing, cyanosis, or edema  LYMPH: No lymphadenopathy noted  SKIN: No rashes or lesions. Multiple ecchymosis    Consultant(s) Notes Reviewed:  [x ] YES  [ ] NO  Care Discussed with Consultants/Other Providers [ x] YES  [ ] NO    LABS:        MEDICATIONS  (STANDING):  cilostazol 50milliGRAM(s) Oral daily  enoxaparin Injectable 30milliGRAM(s) SubCutaneous every 24 hours  metoprolol succinate ER 50milliGRAM(s) Oral daily  simvastatin 20milliGRAM(s) Oral at bedtime  levothyroxine 75MICROGram(s) Oral daily  OLANZapine 1.25milliGRAM(s) Oral at bedtime  acetaminophen   Tablet 650milliGRAM(s) Oral every 6 hours  aspirin  chewable 81milliGRAM(s) Oral daily  senna 1Tablet(s) Oral at bedtime    MEDICATIONS  (PRN):  bisacodyl Suppository 10milliGRAM(s) Rectal daily PRN Constipation                  RADIOLOGY & ADDITIONAL TESTS:    Imaging Personally Reviewed:  [ ] YES  [ ] NO

## 2017-06-24 NOTE — PROGRESS NOTE ADULT - SUBJECTIVE AND OBJECTIVE BOX
GENERAL SURGERY DAILY PROGRESS NOTE:       Subjective:  No events overnight.       Objective:    Vital Signs Last 24 Hrs  T(C): 37, Max: 37 (06-24 @ 00:02)  T(F): 98.6, Max: 98.6 (06-24 @ 00:02)  HR: 80 (69 - 99)  BP: 161/70 (148/87 - 181/73)  BP(mean): --  RR: 18 (16 - 18)  SpO2: 98% (94% - 100%)    I&O's Detail  I & Os for 24h ending 23 Jun 2017 07:00  =============================================  IN:    Oral Fluid: 610 ml    Total IN: 610 ml  ---------------------------------------------  OUT:    Total OUT: 0 ml  ---------------------------------------------  Total NET: 610 ml    I & Os for current day (as of 24 Jun 2017 06:27)  =============================================  IN:    Oral Fluid: 360 ml    Total IN: 360 ml  ---------------------------------------------  OUT:    Voided: 400 ml    Total OUT: 400 ml  ---------------------------------------------  Total NET: -40 ml    Physical Exam:  NAD  lying comfortably in bed  abd soft NTND GENERAL SURGERY DAILY PROGRESS NOTE:       Subjective:  No events overnight. Unable to obtain full HPI and ROS secondary to pt's dementia.       Objective:    Vital Signs Last 24 Hrs  T(C): 37, Max: 37 (06-24 @ 00:02)  T(F): 98.6, Max: 98.6 (06-24 @ 00:02)  HR: 80 (69 - 99)  BP: 161/70 (148/87 - 181/73)  BP(mean): --  RR: 18 (16 - 18)  SpO2: 98% (94% - 100%)    I&O's Detail  I & Os for 24h ending 23 Jun 2017 07:00  =============================================  IN:    Oral Fluid: 610 ml    Total IN: 610 ml  ---------------------------------------------  OUT:    Total OUT: 0 ml  ---------------------------------------------  Total NET: 610 ml    I & Os for current day (as of 24 Jun 2017 06:27)  =============================================  IN:    Oral Fluid: 360 ml    Total IN: 360 ml  ---------------------------------------------  OUT:    Voided: 400 ml    Total OUT: 400 ml  ---------------------------------------------  Total NET: -40 ml    Physical Exam:  NAD  lying comfortably in bed  abd soft NTND

## 2017-06-25 PROCEDURE — 99232 SBSQ HOSP IP/OBS MODERATE 35: CPT

## 2017-06-25 RX ADMIN — SENNA PLUS 1 TABLET(S): 8.6 TABLET ORAL at 23:00

## 2017-06-25 RX ADMIN — CILOSTAZOL 50 MILLIGRAM(S): 100 TABLET ORAL at 12:00

## 2017-06-25 RX ADMIN — Medication 650 MILLIGRAM(S): at 23:00

## 2017-06-25 RX ADMIN — Medication 81 MILLIGRAM(S): at 12:01

## 2017-06-25 RX ADMIN — SIMVASTATIN 20 MILLIGRAM(S): 20 TABLET, FILM COATED ORAL at 23:00

## 2017-06-25 RX ADMIN — ENOXAPARIN SODIUM 30 MILLIGRAM(S): 100 INJECTION SUBCUTANEOUS at 12:03

## 2017-06-25 RX ADMIN — Medication 650 MILLIGRAM(S): at 06:34

## 2017-06-25 RX ADMIN — OLANZAPINE 1.25 MILLIGRAM(S): 15 TABLET, FILM COATED ORAL at 23:00

## 2017-06-25 RX ADMIN — Medication 50 MILLIGRAM(S): at 12:01

## 2017-06-25 RX ADMIN — Medication 650 MILLIGRAM(S): at 12:00

## 2017-06-25 NOTE — PROGRESS NOTE ADULT - ATTENDING COMMENTS
Pt seen at 10AM today. She was in bed and calm, having eaten some breakfast and taken some of her medications. Awaiting TLSO brace and Geriatrics consult. Pt for dispo planning. T2, T7, T10 fractures, manubrial fracture:  Continue Tylenol. Avoid narcotics. Hypothyroidism: continue Synthroid. Dementia: continue Zyprexa. Stage 3 CKD: stable.
- Awaiting rehab planning. Continue home medications as above.
Pt seen and examined at 10am, agree with above. Pt awaiting rehab.
Pt without complaints. TLSO and Tylenol for T2, 7, 10 compression fractures and manubrial fracture. Continue Synthroid for hypothyroidism, Zyprexa for AD. Dispo planning.
This note authored entirely by Gilberto Atkinson [phone]

## 2017-06-25 NOTE — PROGRESS NOTE ADULT - ASSESSMENT
89F s/p fall with T2 compression fx.    - D/c planning to [facility] 89F s/p fall with T2, T7, T10 compression fx, manubrial fracture.    - D/c planning to [facility]

## 2017-06-25 NOTE — PROGRESS NOTE ADULT - SUBJECTIVE AND OBJECTIVE BOX
GENERAL SURGERY DAILY PROGRESS NOTE:       Subjective:  No events overnight. Unable to obtain full HPI and ROS secondary to pt's dementia.       Objective:    Vital Signs Last    I&O's       Physical Exam:  Gen: NAD, lying comfortably in bed  Abdominal: soft, NT, ND GENERAL SURGERY DAILY PROGRESS NOTE:   Pager: [phone]    Subjective:  No events overnight. Unable to obtain full HPI and ROS secondary to pt's dementia.       Objective:      Physical Exam:  Gen: NAD, lying comfortably in bed  Abdominal: soft, NT, ND        Vital Signs Last 24 Hrs  T(C): 36.6, Max: 36.8 (06-24 @ 13:35)  T(F): 97.8, Max: 98.2 (06-24 @ 13:35)  HR: 74 (70 - 84)  BP: 150/74 (143/81 - 152/79)  BP(mean): --  RR: 18 (18 - 18)  SpO2: 98% (95% - 98%)    I&O's Detail  I & Os for 24h ending 25 Jun 2017 07:00  =============================================  IN:    Oral Fluid: 850 ml    Total IN: 850 ml  ---------------------------------------------  OUT:    Total OUT: 0 ml  ---------------------------------------------  Total NET: 850 ml    I & Os for current day (as of 25 Jun 2017 13:29)  =============================================  IN:    Oral Fluid: 260 ml    Total IN: 260 ml  ---------------------------------------------  OUT:    Total OUT: 0 ml  ---------------------------------------------  Total NET: 260 ml

## 2017-06-25 NOTE — PROGRESS NOTE ADULT - SUBJECTIVE AND OBJECTIVE BOX
YOLIE ODONNELL  89y  Female      Patient is a 89y old  Female who presents with a chief complaint of s/p mechanical fall (21 Jun 2017 07:57).  No new events. Sitting in gerichair. Pleasantly confused.      INTERVAL HPI/OVERNIGHT EVENTS:        REVIEW OF SYSTEMS:  CONSTITUTIONAL: No fever, weight loss, or fatigue  EYES: No eye pain, visual disturbances, or discharge  ENMT:  No difficulty hearing, tinnitus, vertigo; No sinus or throat pain  NECK: No pain or stiffness  BREASTS: No pain, masses, or nipple discharge  RESPIRATORY: No cough, wheezing, chills or hemoptysis; No shortness of breath  CARDIOVASCULAR: No chest pain, palpitations, dizziness, or leg swelling  GASTROINTESTINAL: No abdominal or epigastric pain. No nausea, vomiting, or hematemesis; No diarrhea or constipation. No melena or hematochezia.  GENITOURINARY: No dysuria, frequency, hematuria, or incontinence  NEUROLOGICAL: No headaches, memory loss, loss of strength, numbness, or tremors  SKIN: No itching, burning, rashes, or lesions   LYMPH NODES: No enlarged glands  ENDOCRINE: No heat or cold intolerance; No hair loss  MUSCULOSKELETAL: No joint pain or swelling; No muscle, back, or extremity pain  PSYCHIATRIC: No depression, anxiety, mood swings, or difficulty sleeping  HEME/LYMPH: No easy bruising, or bleeding gums  ALLERY AND IMMUNOLOGIC: No hives or eczema    T(C): 36.6, Max: 36.8 (06-24 @ 13:35)  HR: 74 (70 - 84)  BP: 150/74 (143/81 - 152/79)  RR: 18 (18 - 18)  SpO2: 98% (95% - 98%)  Wt(kg): --Vital Signs Last 24 Hrs  T(C): 36.6, Max: 36.8 (06-24 @ 13:35)  T(F): 97.8, Max: 98.2 (06-24 @ 13:35)  HR: 74 (70 - 84)  BP: 150/74 (143/81 - 152/79)  BP(mean): --  RR: 18 (18 - 18)  SpO2: 98% (95% - 98%)    PHYSICAL EXAM:  GENERAL: NAD, well-groomed, frail  HEAD:  Atraumatic, Normocephalic  EYES: EOMI, PERRLA, conjunctiva and sclera clear  ENMT: No tonsillar erythema, exudates, or enlargement; Moist mucous membranes, Good dentition, No lesions  NECK: Supple, No JVD, Normal thyroid  NERVOUS SYSTEM:  Alert & Oriented X1; Motor Strength 5/5 B/L upper and lower extremities  CHEST/LUNG: Clear to percussion bilaterally; No rales, rhonchi, wheezing, or rubs  HEART: Regular rate and rhythm; No murmurs, rubs, or gallops  ABDOMEN: Soft, Nontender, Nondistended; Bowel sounds present  EXTREMITIES:  2+ Peripheral Pulses, No clubbing, cyanosis, or edema  LYMPH: No lymphadenopathy noted  SKIN: No rashes or lesions    Consultant(s) Notes Reviewed:  [x ] YES  [ ] NO  Care Discussed with Consultants/Other Providers [ x] YES  [ ] NO    LABS:              CAPILLARY BLOOD GLUCOSE    MEDICATIONS  (STANDING):  cilostazol 50milliGRAM(s) Oral daily  enoxaparin Injectable 30milliGRAM(s) SubCutaneous every 24 hours  metoprolol succinate ER 50milliGRAM(s) Oral daily  simvastatin 20milliGRAM(s) Oral at bedtime  levothyroxine 75MICROGram(s) Oral daily  OLANZapine 1.25milliGRAM(s) Oral at bedtime  acetaminophen   Tablet 650milliGRAM(s) Oral every 6 hours  aspirin  chewable 81milliGRAM(s) Oral daily  senna 1Tablet(s) Oral at bedtime    MEDICATIONS  (PRN):  bisacodyl Suppository 10milliGRAM(s) Rectal daily PRN Constipation          RADIOLOGY & ADDITIONAL TESTS:    Imaging Personally Reviewed:  [ ] YES  [ ] NO

## 2017-06-26 VITALS
SYSTOLIC BLOOD PRESSURE: 168 MMHG | OXYGEN SATURATION: 97 % | TEMPERATURE: 97 F | RESPIRATION RATE: 16 BRPM | DIASTOLIC BLOOD PRESSURE: 68 MMHG | HEART RATE: 78 BPM

## 2017-06-26 PROCEDURE — 80053 COMPREHEN METABOLIC PANEL: CPT

## 2017-06-26 PROCEDURE — 71250 CT THORAX DX C-: CPT

## 2017-06-26 PROCEDURE — 72170 X-RAY EXAM OF PELVIS: CPT

## 2017-06-26 PROCEDURE — 12001 RPR S/N/AX/GEN/TRNK 2.5CM/<: CPT

## 2017-06-26 PROCEDURE — 85610 PROTHROMBIN TIME: CPT

## 2017-06-26 PROCEDURE — 97162 PT EVAL MOD COMPLEX 30 MIN: CPT

## 2017-06-26 PROCEDURE — 85027 COMPLETE CBC AUTOMATED: CPT

## 2017-06-26 PROCEDURE — 72125 CT NECK SPINE W/O DYE: CPT

## 2017-06-26 PROCEDURE — 96374 THER/PROPH/DIAG INJ IV PUSH: CPT | Mod: XU

## 2017-06-26 PROCEDURE — 74176 CT ABD & PELVIS W/O CONTRAST: CPT

## 2017-06-26 PROCEDURE — 81001 URINALYSIS AUTO W/SCOPE: CPT

## 2017-06-26 PROCEDURE — 97116 GAIT TRAINING THERAPY: CPT

## 2017-06-26 PROCEDURE — 83735 ASSAY OF MAGNESIUM: CPT

## 2017-06-26 PROCEDURE — 80048 BASIC METABOLIC PNL TOTAL CA: CPT

## 2017-06-26 PROCEDURE — 93005 ELECTROCARDIOGRAM TRACING: CPT | Mod: XU

## 2017-06-26 PROCEDURE — 85730 THROMBOPLASTIN TIME PARTIAL: CPT

## 2017-06-26 PROCEDURE — 99222 1ST HOSP IP/OBS MODERATE 55: CPT

## 2017-06-26 PROCEDURE — 84100 ASSAY OF PHOSPHORUS: CPT

## 2017-06-26 PROCEDURE — 70450 CT HEAD/BRAIN W/O DYE: CPT

## 2017-06-26 PROCEDURE — 97530 THERAPEUTIC ACTIVITIES: CPT

## 2017-06-26 PROCEDURE — 99285 EMERGENCY DEPT VISIT HI MDM: CPT | Mod: 25

## 2017-06-26 RX ORDER — SENNA PLUS 8.6 MG/1
1 TABLET ORAL
Qty: 0 | Refills: 0 | COMMUNITY
Start: 2017-06-26

## 2017-06-26 RX ADMIN — ENOXAPARIN SODIUM 30 MILLIGRAM(S): 100 INJECTION SUBCUTANEOUS at 11:10

## 2017-06-26 RX ADMIN — Medication 81 MILLIGRAM(S): at 11:10

## 2017-06-26 RX ADMIN — Medication 75 MICROGRAM(S): at 10:19

## 2017-06-26 RX ADMIN — Medication 50 MILLIGRAM(S): at 10:19

## 2017-06-26 RX ADMIN — CILOSTAZOL 50 MILLIGRAM(S): 100 TABLET ORAL at 11:10

## 2017-06-26 RX ADMIN — Medication 650 MILLIGRAM(S): at 11:10

## 2017-06-26 NOTE — PROGRESS NOTE ADULT - PROBLEM SELECTOR PROBLEM 4
Peripheral Vascular Disease
Acquired hypothyroidism

## 2017-06-26 NOTE — PROGRESS NOTE ADULT - PROBLEM SELECTOR PLAN 3
continue current meds
continue current meds
- stable
continue current meds

## 2017-06-26 NOTE — PROGRESS NOTE ADULT - PROBLEM SELECTOR PROBLEM 3
Hypothyroidism, unspecified type
Stage 3 chronic kidney disease

## 2017-06-26 NOTE — DISCHARGE NOTE ADULT - CARE PLAN
Principal Discharge DX:	Compression fx, thoracic spine  Goal:	Pain control, recovery  Instructions for follow-up, activity and diet:	1.  Regular diet  2.  Activity as tolerated  3.  Follow-up with Dr. Waldron within 1-2 weeks.  Please call office for appointment.  4.  Wear back brace for comfort  5.  Follow-up with Dr. Good within 1-2 weeks.  Please call office for appointment.  Secondary Diagnosis:	Closed fracture of second thoracic vertebra, unspecified fracture morphology, initial encounter  Secondary Diagnosis:	Coronary artery disease

## 2017-06-26 NOTE — DISCHARGE NOTE ADULT - CARE PROVIDER_API CALL
Rasheed Waldron), Neurological Surgery  [address] [place] [address]  Phone: [phone]  Fax: [phone]    Raghav Good), Surgery; Surgical Critical Care  [id number] [place] [address]  Phone: [phone]  Fax: [phone]

## 2017-06-26 NOTE — PROGRESS NOTE ADULT - PROBLEM SELECTOR PROBLEM 2
Essential hypertension
Late onset Alzheimer's disease with behavioral disturbance

## 2017-06-26 NOTE — PROGRESS NOTE ADULT - ASSESSMENT
elderly female sp fall thoracic compression fracture medically ready for discharge to [facility] when bed available

## 2017-06-26 NOTE — DISCHARGE NOTE ADULT - MEDICATION SUMMARY - MEDICATIONS TO TAKE
I will START or STAY ON the medications listed below when I get home from the hospital:    Aspirin Enteric Coated 81 mg oral delayed release tablet  -- 1 tab(s) by mouth once a day  -- Indication: For Heart    acetaminophen 325 mg oral tablet  -- 2 tab(s) by mouth every 6 hours, As needed, Mild Pain (1 - 3)  -- Indication: For mild pain    simvastatin 20 mg oral tablet  -- 1 tab(s) by mouth once a day (at bedtime)  -- Indication: For Cholesterol    ZyPREXA 2.5 mg oral tablet  -- 0.5 tab(s) by mouth once a day  -- Indication: For mood    metoprolol succinate 50 mg oral tablet, extended release  -- 1 tab(s) by mouth once a day  -- Indication: For blood pressure    bisacodyl 10 mg rectal suppository  -- 1 suppository(ies) rectally once a day, As needed, Constipation  -- Indication: For Constipation, unspecified constipation type    senna oral tablet  -- 1 tab(s) by mouth once a day (at bedtime)  -- Indication: For Prevent constipation    cilostazol 50 mg oral tablet  -- 1 tab(s) by mouth 2 times a day  -- Indication: For Heart    levothyroxine 75 mcg (0.075 mg) oral tablet  -- 1 tab(s) by mouth once a day  -- Indication: For thyroid

## 2017-06-26 NOTE — PROGRESS NOTE ADULT - PROBLEM SELECTOR PLAN 4
continue current meds
continue current meds
- Continue Synthroid
continue current meds

## 2017-06-26 NOTE — DISCHARGE NOTE ADULT - PLAN OF CARE
Pain control, recovery 1.  Regular diet  2.  Activity as tolerated  3.  Follow-up with Dr. Waldron within 1-2 weeks.  Please call office for appointment.  4.  Wear back brace for comfort  5.  Follow-up with Dr. Good within 1-2 weeks.  Please call office for appointment.

## 2017-06-26 NOTE — PROGRESS NOTE ADULT - PROBLEM SELECTOR PROBLEM 6
Closed fracture of second thoracic vertebra, unspecified fracture morphology, initial encounter

## 2017-06-26 NOTE — PROGRESS NOTE ADULT - PROBLEM SELECTOR PROBLEM 1
Coronary artery disease involving native coronary artery of native heart without angina pectoris
Closed fracture of second thoracic vertebra, unspecified fracture morphology, initial encounter
Compression fracture of thoracic vertebra, closed, initial encounter
Closed fracture of second thoracic vertebra, unspecified fracture morphology, initial encounter
Compression fracture of thoracic vertebra, closed, initial encounter

## 2017-06-26 NOTE — PROGRESS NOTE ADULT - PROBLEM SELECTOR PLAN 2
continue current meds
continue current meds
- Continue Zyprexa
assist with ADL's,
- Continue Zyprexa
assist with ADL's
continue current meds

## 2017-06-26 NOTE — PROGRESS NOTE ADULT - PROBLEM SELECTOR PLAN 1
continue current meds
T2, T7, T10 compression fractures, manubrial fracture  - Continue standing Tylenol, avoid narcotics  - TLSO brace  - PT
analgesia prn, awaiting subacute rehab.
continue current meds
T2, T7, T10 compression fractures, manubrial fracture  - Continue standing Tylenol, avoid narcotics  - TLSO brace  - PT
analgesia prn. Awaiting transfer to subacute rehab.
continue current meds

## 2017-06-26 NOTE — PROGRESS NOTE ADULT - SUBJECTIVE AND OBJECTIVE BOX
subjective: pleasantly demented,c/o constipation    MEDICATIONS  (STANDING):  cilostazol 50milliGRAM(s) Oral daily  enoxaparin Injectable 30milliGRAM(s) SubCutaneous every 24 hours  metoprolol succinate ER 50milliGRAM(s) Oral daily  simvastatin 20milliGRAM(s) Oral at bedtime  levothyroxine 75MICROGram(s) Oral daily  OLANZapine 1.25milliGRAM(s) Oral at bedtime  acetaminophen   Tablet 650milliGRAM(s) Oral every 6 hours  aspirin  chewable 81milliGRAM(s) Oral daily  senna 1Tablet(s) Oral at bedtime    MEDICATIONS  (PRN):  bisacodyl Suppository 10milliGRAM(s) Rectal daily PRN Constipation    Gen AAOx1  Heart RRR c II/VI syst M  lungs CTAB  abdomen soft nt nd +BS  ext no CCE  MS mild right lower costochondral tenderness

## 2017-06-26 NOTE — PROGRESS NOTE ADULT - SUBJECTIVE AND OBJECTIVE BOX
GENERAL SURGERY DAILY PROGRESS NOTE:   Pager: [phone]    Subjective:  No events overnight. Unable to obtain full HPI and ROS secondary to pt's dementia.       Objective:      Physical Exam:  Gen: NAD, lying comfortably in bed  Abdominal: soft, NT, ND        Vital Signs       I&O          LABS:

## 2017-06-26 NOTE — DISCHARGE NOTE ADULT - ADDITIONAL INSTRUCTIONS
Follow-up with Dr. Waldron and Dr. Good within 1-2 weeks.  Please call office for appointment.  Please follow-up with Dr. Devi (your primary medical doctor) within 1-2 weeks.

## 2017-06-26 NOTE — PROGRESS NOTE ADULT - PROVIDER SPECIALTY LIST ADULT
Internal Medicine
Internal Medicine
Surgery
Trauma Surgery
Geriatrics
Internal Medicine
Geriatrics

## 2017-06-26 NOTE — DISCHARGE NOTE ADULT - SECONDARY DIAGNOSIS.
Closed fracture of second thoracic vertebra, unspecified fracture morphology, initial encounter Coronary artery disease

## 2017-06-26 NOTE — PROGRESS NOTE ADULT - PROBLEM SELECTOR PROBLEM 7
Late onset Alzheimer's disease with behavioral disturbance

## 2017-06-26 NOTE — DISCHARGE NOTE ADULT - HOSPITAL COURSE
This is a 89 year old female w/ PMH of HTN, hypothyroidism, CAD (no stents), CKD III, arthritis and R hip fx s/p ORIF by Dr Hunt in 2012 who presents today after an unwitnessed fall off of her bed some time last night. She does not recall the fall, unknown LOC, the aid endorses that she just saw her later after she had fallen down. She ambulates with a walker at home. The son mentions that she had a similar fall in 10/2016 where she had a scalp laceration with no intracranial injuries and was discharged from the ED at the time. She denies headache, she c/o vague pain all over, no fevers, no chills, no seizures as per family. She does make urine (w/ diapers). No abdominal pain, no nausea, no vomiting. She denies chest pain or SOB.    1ry survey - intact --> GCS 15, AVSS  2ry survey --> + midline scalp lac & forehead ecchymosis, + epigastric abd TTP      Imaging studies revealed T2,7,10, manubrium fracture.  She was admitted to the surgical service.  She was evaluated by physical therapy who recommended subacute rehab.  She is tolerating a diet, pain is controlled, and she is stable for discharge to rehab.  She will follow-up with Dr. Waldron (neurosurgery) and Dr. Good within 1-2 weeks.

## 2017-06-26 NOTE — DISCHARGE NOTE ADULT - PATIENT PORTAL LINK FT
“You can access the [facility] Patient Portal, offered by [facility], by registering with the following website: [email or web address]”

## 2017-08-01 PROCEDURE — G9001: CPT

## 2019-02-21 ENCOUNTER — EMERGENCY (EMERGENCY)
Facility: HOSPITAL | Age: 84
LOS: 1 days | Discharge: HOME | End: 2019-02-21
Attending: EMERGENCY MEDICINE
Payer: MEDICARE

## 2019-02-21 VITALS
DIASTOLIC BLOOD PRESSURE: 70 MMHG | RESPIRATION RATE: 18 BRPM | WEIGHT: 72.09 LBS | OXYGEN SATURATION: 94 % | SYSTOLIC BLOOD PRESSURE: 175 MMHG | HEIGHT: 60 IN | HEART RATE: 89 BPM

## 2019-02-21 VITALS
OXYGEN SATURATION: 98 % | DIASTOLIC BLOOD PRESSURE: 75 MMHG | HEART RATE: 84 BPM | SYSTOLIC BLOOD PRESSURE: 147 MMHG | RESPIRATION RATE: 18 BRPM

## 2019-02-21 DIAGNOSIS — Z98.89 OTHER SPECIFIED POSTPROCEDURAL STATES: Chronic | ICD-10-CM

## 2019-02-21 LAB
ALBUMIN SERPL ELPH-MCNC: 3.9 G/DL — SIGNIFICANT CHANGE UP (ref 3.3–5)
ALP SERPL-CCNC: 121 U/L — HIGH (ref 40–120)
ALT FLD-CCNC: 19 U/L — SIGNIFICANT CHANGE UP (ref 10–45)
ANION GAP SERPL CALC-SCNC: 11 MMOL/L — SIGNIFICANT CHANGE UP (ref 5–17)
APPEARANCE UR: CLEAR — SIGNIFICANT CHANGE UP
APTT BLD: 28.7 SEC — SIGNIFICANT CHANGE UP (ref 27.5–36.3)
AST SERPL-CCNC: 31 U/L — SIGNIFICANT CHANGE UP (ref 10–40)
BACTERIA # UR AUTO: NEGATIVE — SIGNIFICANT CHANGE UP
BASOPHILS # BLD AUTO: 0.1 K/UL — SIGNIFICANT CHANGE UP (ref 0–0.2)
BASOPHILS NFR BLD AUTO: 0.9 % — SIGNIFICANT CHANGE UP (ref 0–2)
BILIRUB SERPL-MCNC: 0.5 MG/DL — SIGNIFICANT CHANGE UP (ref 0.2–1.2)
BILIRUB UR-MCNC: NEGATIVE — SIGNIFICANT CHANGE UP
BUN SERPL-MCNC: 35 MG/DL — HIGH (ref 7–23)
CALCIUM SERPL-MCNC: 9.7 MG/DL — SIGNIFICANT CHANGE UP (ref 8.4–10.5)
CHLORIDE SERPL-SCNC: 103 MMOL/L — SIGNIFICANT CHANGE UP (ref 96–108)
CO2 SERPL-SCNC: 27 MMOL/L — SIGNIFICANT CHANGE UP (ref 22–31)
COLOR SPEC: YELLOW — SIGNIFICANT CHANGE UP
CREAT SERPL-MCNC: 0.9 MG/DL — SIGNIFICANT CHANGE UP (ref 0.5–1.3)
DIFF PNL FLD: NEGATIVE — SIGNIFICANT CHANGE UP
EOSINOPHIL # BLD AUTO: 0.1 K/UL — SIGNIFICANT CHANGE UP (ref 0–0.5)
EOSINOPHIL NFR BLD AUTO: 1.4 % — SIGNIFICANT CHANGE UP (ref 0–6)
EPI CELLS # UR: 2 /HPF — SIGNIFICANT CHANGE UP
GLUCOSE SERPL-MCNC: 84 MG/DL — SIGNIFICANT CHANGE UP (ref 70–99)
GLUCOSE UR QL: NEGATIVE — SIGNIFICANT CHANGE UP
HCT VFR BLD CALC: 38.2 % — SIGNIFICANT CHANGE UP (ref 34.5–45)
HGB BLD-MCNC: 13.1 G/DL — SIGNIFICANT CHANGE UP (ref 11.5–15.5)
HYALINE CASTS # UR AUTO: 1 /LPF — SIGNIFICANT CHANGE UP (ref 0–2)
INR BLD: 1.01 RATIO — SIGNIFICANT CHANGE UP (ref 0.88–1.16)
KETONES UR-MCNC: NEGATIVE — SIGNIFICANT CHANGE UP
LEUKOCYTE ESTERASE UR-ACNC: NEGATIVE — SIGNIFICANT CHANGE UP
LYMPHOCYTES # BLD AUTO: 2 K/UL — SIGNIFICANT CHANGE UP (ref 1–3.3)
LYMPHOCYTES # BLD AUTO: 31.1 % — SIGNIFICANT CHANGE UP (ref 13–44)
MCHC RBC-ENTMCNC: 31.4 PG — SIGNIFICANT CHANGE UP (ref 27–34)
MCHC RBC-ENTMCNC: 34.4 GM/DL — SIGNIFICANT CHANGE UP (ref 32–36)
MCV RBC AUTO: 91.1 FL — SIGNIFICANT CHANGE UP (ref 80–100)
MONOCYTES # BLD AUTO: 0.6 K/UL — SIGNIFICANT CHANGE UP (ref 0–0.9)
MONOCYTES NFR BLD AUTO: 9.4 % — SIGNIFICANT CHANGE UP (ref 2–14)
NEUTROPHILS # BLD AUTO: 3.6 K/UL — SIGNIFICANT CHANGE UP (ref 1.8–7.4)
NEUTROPHILS NFR BLD AUTO: 57.2 % — SIGNIFICANT CHANGE UP (ref 43–77)
NITRITE UR-MCNC: NEGATIVE — SIGNIFICANT CHANGE UP
PH UR: 6 — SIGNIFICANT CHANGE UP (ref 5–8)
PLATELET # BLD AUTO: 265 K/UL — SIGNIFICANT CHANGE UP (ref 150–400)
POTASSIUM SERPL-MCNC: 5.7 MMOL/L — HIGH (ref 3.5–5.3)
POTASSIUM SERPL-SCNC: 5.7 MMOL/L — HIGH (ref 3.5–5.3)
PROT SERPL-MCNC: 6.9 G/DL — SIGNIFICANT CHANGE UP (ref 6–8.3)
PROT UR-MCNC: ABNORMAL
PROTHROM AB SERPL-ACNC: 11.5 SEC — SIGNIFICANT CHANGE UP (ref 10–12.9)
RAPID RVP RESULT: SIGNIFICANT CHANGE UP
RBC # BLD: 4.19 M/UL — SIGNIFICANT CHANGE UP (ref 3.8–5.2)
RBC # FLD: 12.6 % — SIGNIFICANT CHANGE UP (ref 10.3–14.5)
RBC CASTS # UR COMP ASSIST: 4 /HPF — SIGNIFICANT CHANGE UP (ref 0–4)
SODIUM SERPL-SCNC: 141 MMOL/L — SIGNIFICANT CHANGE UP (ref 135–145)
SP GR SPEC: 1.03 — HIGH (ref 1.01–1.02)
UROBILINOGEN FLD QL: NEGATIVE — SIGNIFICANT CHANGE UP
WBC # BLD: 6.2 K/UL — SIGNIFICANT CHANGE UP (ref 3.8–10.5)
WBC # FLD AUTO: 6.2 K/UL — SIGNIFICANT CHANGE UP (ref 3.8–10.5)
WBC UR QL: 1 /HPF — SIGNIFICANT CHANGE UP (ref 0–5)

## 2019-02-21 PROCEDURE — 99284 EMERGENCY DEPT VISIT MOD MDM: CPT | Mod: 25

## 2019-02-21 PROCEDURE — 87798 DETECT AGENT NOS DNA AMP: CPT

## 2019-02-21 PROCEDURE — 87633 RESP VIRUS 12-25 TARGETS: CPT

## 2019-02-21 PROCEDURE — 85730 THROMBOPLASTIN TIME PARTIAL: CPT

## 2019-02-21 PROCEDURE — 70450 CT HEAD/BRAIN W/O DYE: CPT | Mod: 26

## 2019-02-21 PROCEDURE — 87486 CHLMYD PNEUM DNA AMP PROBE: CPT

## 2019-02-21 PROCEDURE — 71045 X-RAY EXAM CHEST 1 VIEW: CPT | Mod: 26

## 2019-02-21 PROCEDURE — 99284 EMERGENCY DEPT VISIT MOD MDM: CPT

## 2019-02-21 PROCEDURE — 71045 X-RAY EXAM CHEST 1 VIEW: CPT

## 2019-02-21 PROCEDURE — 70450 CT HEAD/BRAIN W/O DYE: CPT

## 2019-02-21 PROCEDURE — 85610 PROTHROMBIN TIME: CPT

## 2019-02-21 PROCEDURE — 93005 ELECTROCARDIOGRAM TRACING: CPT

## 2019-02-21 PROCEDURE — 87086 URINE CULTURE/COLONY COUNT: CPT

## 2019-02-21 PROCEDURE — 81001 URINALYSIS AUTO W/SCOPE: CPT

## 2019-02-21 PROCEDURE — 80053 COMPREHEN METABOLIC PANEL: CPT

## 2019-02-21 PROCEDURE — 87581 M.PNEUMON DNA AMP PROBE: CPT

## 2019-02-21 PROCEDURE — 85027 COMPLETE CBC AUTOMATED: CPT

## 2019-02-21 NOTE — ED PROVIDER NOTE - NSFOLLOWUPINSTRUCTIONS_ED_ALL_ED_FT
1. Follow up with your primary care doctor in the next 1-2 days  2. Rest, increase fluids   3. Continue all at home medications  4. Return to ED for any new or worsened symptoms

## 2019-02-21 NOTE — ED PROVIDER NOTE - PROGRESS NOTE DETAILS
Pt work-up negative thus far w/ normal labs, normal cxr, and neg ua. Pt pending RVP, no respiratory symptoms. Discussed w/ son who feels comfortable taking pt home. Will call if + result of rvp  Karine Viveros PA-C

## 2019-02-21 NOTE — ED PROVIDER NOTE - OBJECTIVE STATEMENT
89 year old female with pmhx HTN, hypothyroid, CAD, CKD presents to ED w/ AMS. Per pts son he was called by her aid this morning when patient was noted to be not acting herself. He states that she was extremely lethargic, pale and non-responsive and would not open her eyes or respond to commands. EMS was eventually called and patient eventually returned to her baseline while they were present but the state her GCS was 13 upon their arrival.  Patient does not remember incident and is currently without complaints. Per son she has not had recent fevers, cough/cold symptoms, change in urination and has been without complaints. Reports constipation over a week ago, since resolved

## 2019-02-21 NOTE — ED ADULT TRIAGE NOTE - CHIEF COMPLAINT QUOTE
woke up at noon, altered mental status intermittently   GCS of 13 for ems   HTN and HLD woke up at noon, altered mental status intermittently   GCS of 13 for ems   HTN and HLD  evaluation at desk by MD Harrington, pt speaking with team, awake and alert.

## 2019-02-21 NOTE — ED ADULT NURSE NOTE - CHIEF COMPLAINT QUOTE
woke up at noon, altered mental status intermittently   GCS of 13 for ems   HTN and HLD  evaluation at desk by MD Harrington, pt speaking with team, awake and alert.

## 2019-02-21 NOTE — ED ADULT NURSE NOTE - NSIMPLEMENTINTERV_GEN_ALL_ED
Implemented All Fall with Harm Risk Interventions:  [place] to call system. Call bell, personal items and telephone within reach. Instruct patient to call for assistance. Room bathroom lighting operational. Non-slip footwear when patient is off stretcher. Physically safe environment: no spills, clutter or unnecessary equipment. Stretcher in lowest position, wheels locked, appropriate side rails in place. Provide visual cue, wrist band, yellow gown, etc. Monitor gait and stability. Monitor for mental status changes and reorient to person, place, and time. Review medications for side effects contributing to fall risk. Reinforce activity limits and safety measures with patient and family. Provide visual clues: red socks.

## 2019-02-21 NOTE — ED ADULT TRIAGE NOTE - ARRIVAL INFO ADDITIONAL COMMENTS
pt was not responding for ems, perking up at desk. directly to CCD unable to get temperature . pt improving at desk, talking and interacting. evaluated by CC team.

## 2019-02-21 NOTE — ED ADULT NURSE NOTE - OBJECTIVE STATEMENT
Pt is a non ambulatory 89 yr old female BIBA after son found her unresponsive.  Pt returned to baseline upon arrival of EMS.  Pt at baseline mental status as per son at bedside.  A/oX1.  PERRl wnl, page with equal strength.  LUNGS CTA.  Denies chest pain or sob.  Denies N/v/F.  Abdomen NT ND.  Urine in diaper is cloudy and foul smelling.  Peripheral pulses +1bl no edema

## 2019-02-21 NOTE — ED PROVIDER NOTE - CLINICAL SUMMARY MEDICAL DECISION MAKING FREE TEXT BOX
Mala: 89 year old female with ?ams this morning. Per son, patient was not acting herself and was not very responsive this am for a short period of time. ems called and patient at baseline. Patient has been at baseline since. moving all extremities. alert and oriented x 1. no focal neuro deficits. will get labs, ivf, ct head, r/o infectious versus metabolic versus neuro cause.

## 2019-02-22 LAB
CULTURE RESULTS: SIGNIFICANT CHANGE UP
SPECIMEN SOURCE: SIGNIFICANT CHANGE UP

## 2022-01-11 NOTE — ED ADULT NURSE NOTE - NS ED NURSE LEVEL OF CONSCIOUSNESS ORIENTATION
No new care gaps identified.  Powered by [employer]. Reference number: [id number].   1/11/2022 2:39:39 PM CST  
Refilled x 1. Pt is due for follow up, but please schedule in 1 month due to current covid surge.     I will order pre-visit labs. If there are any open labs ordered by other specialists, please also link those to lab visit.  
This Rx Request does not qualify for assessment with the OR.    Please review protocol details and the Care Due Message for extra clinical information    Reasons Rx Request may be deferred:  Labs/Vitals overdue  Labs/Vitals abnormal  Patient has been seen in the ED/Hospital since the last PCP visit  Medication is non-delegated  Provider is a non-participating provider   
Disoriented

## 2024-01-01 NOTE — PATIENT PROFILE ADULT. - NS PRO AD PATIENT TYPE
Do Not Resuscitate (DNR)/Health Care Proxy (HCP)
-'s hands and feet may be bluish in color for a few days.

## 2024-01-04 NOTE — PHYSICAL THERAPY INITIAL EVALUATION ADULT - PHYSICAL ASSIST/NONPHYSICAL ASSIST, REHAB EVAL
Quality 130: Documentation Of Current Medications In The Medical Record: Current Medications Documented Detail Level: Detailed Quality 47: Advance Care Plan: Advance Care Planning discussed and documented in the medical record; patient did not wish or was not able to name a surrogate decision maker or provide an advance care plan. Quality 110: Preventive Care And Screening: Influenza Immunization: Influenza Immunization not Administered because Patient Refused. Quality 431: Preventive Care And Screening: Unhealthy Alcohol Use - Screening: Patient not identified as an unhealthy alcohol user when screened for unhealthy alcohol use using a systematic screening method Quality 111:Pneumonia Vaccination Status For Older Adults: Patient did not receive any pneumococcal conjugate or polysaccharide vaccine on or after their 60th birthday and before the end of the measurement period Quality 226: Preventive Care And Screening: Tobacco Use: Screening And Cessation Intervention: Patient screened for tobacco use and is an ex/non-smoker supervision/verbal cues/1 person assist